# Patient Record
Sex: FEMALE | Race: WHITE | URBAN - METROPOLITAN AREA
[De-identification: names, ages, dates, MRNs, and addresses within clinical notes are randomized per-mention and may not be internally consistent; named-entity substitution may affect disease eponyms.]

---

## 2023-09-29 LAB
C. TRACHOMATIS, EXTERNAL RESULT: NEGATIVE
N. GONORRHOEAE, EXTERNAL RESULT: NEGATIVE

## 2023-10-24 LAB
ABO, EXTERNAL RESULT: NORMAL
HEP B, EXTERNAL RESULT: NEGATIVE
HEPATITIS C ANTIBODY, EXTERNAL RESULT: NEGATIVE
HIV, EXTERNAL RESULT: NON REACTIVE
RH FACTOR, EXTERNAL RESULT: NEGATIVE
RPR, EXTERNAL RESULT: NON REACTIVE
RUBELLA TITER, EXTERNAL RESULT: NORMAL

## 2024-04-23 LAB — GBS, EXTERNAL RESULT: NEGATIVE

## 2024-05-01 ENCOUNTER — HOSPITAL ENCOUNTER (INPATIENT)
Age: 34
LOS: 3 days | Discharge: HOME | End: 2024-05-04
Attending: OBSTETRICS & GYNECOLOGY | Admitting: OBSTETRICS & GYNECOLOGY
Payer: COMMERCIAL

## 2024-05-01 PROBLEM — O13.9 GESTATIONAL HYPERTENSION AFFECTING FIRST PREGNANCY: Status: ACTIVE | Noted: 2024-05-01

## 2024-05-01 LAB
ABO + RH BLD: NORMAL
ALBUMIN SERPL-MCNC: 3.5 G/DL (ref 3.4–5)
ALBUMIN/GLOB SERPL: 1.3 {RATIO} (ref 1.1–2.2)
ALP SERPL-CCNC: 83 U/L (ref 40–129)
ALT SERPL-CCNC: 10 U/L (ref 10–40)
AMPHETAMINES UR QL SCN>1000 NG/ML: NORMAL
ANION GAP SERPL CALCULATED.3IONS-SCNC: 13 MMOL/L (ref 3–16)
AST SERPL-CCNC: 17 U/L (ref 15–37)
BARBITURATES UR QL SCN>200 NG/ML: NORMAL
BASOPHILS # BLD: 0 K/UL (ref 0–0.2)
BASOPHILS NFR BLD: 0.2 %
BENZODIAZ UR QL SCN>200 NG/ML: NORMAL
BILIRUB SERPL-MCNC: <0.2 MG/DL (ref 0–1)
BLD GP AB SCN SERPL QL: NORMAL
BLOOD GROUP ANTIBODIES SERPL: NORMAL
BUN SERPL-MCNC: 9 MG/DL (ref 7–20)
BUPRENORPHINE+NOR UR QL SCN: NORMAL
CALCIUM SERPL-MCNC: 8.6 MG/DL (ref 8.3–10.6)
CANNABINOIDS UR QL SCN>50 NG/ML: NORMAL
CHLORIDE SERPL-SCNC: 105 MMOL/L (ref 99–110)
CO2 SERPL-SCNC: 20 MMOL/L (ref 21–32)
COCAINE UR QL SCN: NORMAL
CREAT SERPL-MCNC: 0.7 MG/DL (ref 0.6–1.1)
CREAT UR-MCNC: 131.1 MG/DL (ref 28–259)
DAT IGG CAPTURE: NORMAL
DEPRECATED RDW RBC AUTO: 13.7 % (ref 12.4–15.4)
DRUG SCREEN COMMENT UR-IMP: NORMAL
EOSINOPHIL # BLD: 0.2 K/UL (ref 0–0.6)
EOSINOPHIL NFR BLD: 1.3 %
FENTANYL SCREEN, URINE: NORMAL
GFR SERPLBLD CREATININE-BSD FMLA CKD-EPI: >90 ML/MIN/{1.73_M2}
GLUCOSE SERPL-MCNC: 80 MG/DL (ref 70–99)
HCT VFR BLD AUTO: 36.8 % (ref 36–48)
HGB BLD-MCNC: 12.5 G/DL (ref 12–16)
LDH SERPL L TO P-CCNC: 181 U/L (ref 100–190)
LYMPHOCYTES # BLD: 2.1 K/UL (ref 1–5.1)
LYMPHOCYTES NFR BLD: 17.9 %
MCH RBC QN AUTO: 29.3 PG (ref 26–34)
MCHC RBC AUTO-ENTMCNC: 33.9 G/DL (ref 31–36)
MCV RBC AUTO: 86.6 FL (ref 80–100)
METHADONE UR QL SCN>300 NG/ML: NORMAL
MONOCYTES # BLD: 1 K/UL (ref 0–1.3)
MONOCYTES NFR BLD: 8 %
NEUTROPHILS # BLD: 8.6 K/UL (ref 1.7–7.7)
NEUTROPHILS NFR BLD: 72.6 %
OPIATES UR QL SCN>300 NG/ML: NORMAL
OXYCODONE UR QL SCN: NORMAL
PCP UR QL SCN>25 NG/ML: NORMAL
PH UR STRIP: 5 [PH]
PLATELET # BLD AUTO: 178 K/UL (ref 135–450)
PMV BLD AUTO: 10.3 FL (ref 5–10.5)
POTASSIUM SERPL-SCNC: 3.8 MMOL/L (ref 3.5–5.1)
PROT SERPL-MCNC: 6.3 G/DL (ref 6.4–8.2)
PROT UR-MCNC: 16 MG/DL
PROT/CREAT UR-RTO: 0.1 MG/DL
RBC # BLD AUTO: 4.25 M/UL (ref 4–5.2)
SODIUM SERPL-SCNC: 138 MMOL/L (ref 136–145)
URATE SERPL-MCNC: 5.3 MG/DL (ref 2.6–6)
WBC # BLD AUTO: 11.9 K/UL (ref 4–11)

## 2024-05-01 PROCEDURE — 80307 DRUG TEST PRSMV CHEM ANLYZR: CPT

## 2024-05-01 PROCEDURE — 86901 BLOOD TYPING SEROLOGIC RH(D): CPT

## 2024-05-01 PROCEDURE — 86780 TREPONEMA PALLIDUM: CPT

## 2024-05-01 PROCEDURE — 6370000000 HC RX 637 (ALT 250 FOR IP): Performed by: ADVANCED PRACTICE MIDWIFE

## 2024-05-01 PROCEDURE — 86900 BLOOD TYPING SEROLOGIC ABO: CPT

## 2024-05-01 PROCEDURE — 84550 ASSAY OF BLOOD/URIC ACID: CPT

## 2024-05-01 PROCEDURE — 84156 ASSAY OF PROTEIN URINE: CPT

## 2024-05-01 PROCEDURE — 86850 RBC ANTIBODY SCREEN: CPT

## 2024-05-01 PROCEDURE — 86880 COOMBS TEST DIRECT: CPT

## 2024-05-01 PROCEDURE — 86870 RBC ANTIBODY IDENTIFICATION: CPT

## 2024-05-01 PROCEDURE — 82570 ASSAY OF URINE CREATININE: CPT

## 2024-05-01 PROCEDURE — 83615 LACTATE (LD) (LDH) ENZYME: CPT

## 2024-05-01 PROCEDURE — 1220000000 HC SEMI PRIVATE OB R&B

## 2024-05-01 PROCEDURE — 85025 COMPLETE CBC W/AUTO DIFF WBC: CPT

## 2024-05-01 PROCEDURE — 36415 COLL VENOUS BLD VENIPUNCTURE: CPT

## 2024-05-01 PROCEDURE — 80053 COMPREHEN METABOLIC PANEL: CPT

## 2024-05-01 RX ORDER — ONDANSETRON 4 MG/1
4 TABLET, ORALLY DISINTEGRATING ORAL EVERY 6 HOURS PRN
Status: DISCONTINUED | OUTPATIENT
Start: 2024-05-01 | End: 2024-05-04 | Stop reason: HOSPADM

## 2024-05-01 RX ORDER — METHYLERGONOVINE MALEATE 0.2 MG/ML
200 INJECTION INTRAVENOUS PRN
Status: DISCONTINUED | OUTPATIENT
Start: 2024-05-01 | End: 2024-05-04 | Stop reason: HOSPADM

## 2024-05-01 RX ORDER — TERBUTALINE SULFATE 1 MG/ML
0.25 INJECTION, SOLUTION SUBCUTANEOUS
Status: DISCONTINUED | OUTPATIENT
Start: 2024-05-01 | End: 2024-05-02

## 2024-05-01 RX ORDER — CARBOPROST TROMETHAMINE 250 UG/ML
250 INJECTION, SOLUTION INTRAMUSCULAR PRN
Status: DISCONTINUED | OUTPATIENT
Start: 2024-05-01 | End: 2024-05-04 | Stop reason: HOSPADM

## 2024-05-01 RX ORDER — DOCUSATE SODIUM 100 MG/1
100 CAPSULE, LIQUID FILLED ORAL 2 TIMES DAILY
Status: DISCONTINUED | OUTPATIENT
Start: 2024-05-01 | End: 2024-05-03 | Stop reason: SDUPTHER

## 2024-05-01 RX ORDER — FAMOTIDINE 10 MG/ML
20 INJECTION, SOLUTION INTRAVENOUS 2 TIMES DAILY
Status: DISCONTINUED | OUTPATIENT
Start: 2024-05-01 | End: 2024-05-04 | Stop reason: HOSPADM

## 2024-05-01 RX ORDER — SODIUM CHLORIDE, SODIUM LACTATE, POTASSIUM CHLORIDE, AND CALCIUM CHLORIDE .6; .31; .03; .02 G/100ML; G/100ML; G/100ML; G/100ML
500 INJECTION, SOLUTION INTRAVENOUS PRN
Status: DISCONTINUED | OUTPATIENT
Start: 2024-05-01 | End: 2024-05-04 | Stop reason: HOSPADM

## 2024-05-01 RX ORDER — NALBUPHINE HYDROCHLORIDE 10 MG/ML
10 INJECTION, SOLUTION INTRAMUSCULAR; INTRAVENOUS; SUBCUTANEOUS
Status: DISCONTINUED | OUTPATIENT
Start: 2024-05-01 | End: 2024-05-02

## 2024-05-01 RX ORDER — SODIUM CHLORIDE, SODIUM LACTATE, POTASSIUM CHLORIDE, AND CALCIUM CHLORIDE .6; .31; .03; .02 G/100ML; G/100ML; G/100ML; G/100ML
1000 INJECTION, SOLUTION INTRAVENOUS PRN
Status: DISCONTINUED | OUTPATIENT
Start: 2024-05-01 | End: 2024-05-04 | Stop reason: HOSPADM

## 2024-05-01 RX ORDER — MISOPROSTOL 100 UG/1
400 TABLET ORAL PRN
Status: DISCONTINUED | OUTPATIENT
Start: 2024-05-01 | End: 2024-05-04 | Stop reason: HOSPADM

## 2024-05-01 RX ORDER — FAMOTIDINE 20 MG/1
20 TABLET, FILM COATED ORAL 2 TIMES DAILY
Status: ON HOLD | COMMUNITY
End: 2024-05-04 | Stop reason: HOSPADM

## 2024-05-01 RX ORDER — SODIUM CHLORIDE 0.9 % (FLUSH) 0.9 %
5-40 SYRINGE (ML) INJECTION EVERY 12 HOURS SCHEDULED
Status: DISCONTINUED | OUTPATIENT
Start: 2024-05-01 | End: 2024-05-03 | Stop reason: SDUPTHER

## 2024-05-01 RX ORDER — SODIUM CHLORIDE 0.9 % (FLUSH) 0.9 %
5-40 SYRINGE (ML) INJECTION PRN
Status: DISCONTINUED | OUTPATIENT
Start: 2024-05-01 | End: 2024-05-04 | Stop reason: HOSPADM

## 2024-05-01 RX ORDER — ONDANSETRON 2 MG/ML
4 INJECTION INTRAMUSCULAR; INTRAVENOUS EVERY 6 HOURS PRN
Status: DISCONTINUED | OUTPATIENT
Start: 2024-05-01 | End: 2024-05-04 | Stop reason: HOSPADM

## 2024-05-01 RX ORDER — SODIUM CHLORIDE 9 MG/ML
25 INJECTION, SOLUTION INTRAVENOUS PRN
Status: DISCONTINUED | OUTPATIENT
Start: 2024-05-01 | End: 2024-05-04 | Stop reason: HOSPADM

## 2024-05-01 RX ORDER — SODIUM CHLORIDE, SODIUM LACTATE, POTASSIUM CHLORIDE, CALCIUM CHLORIDE 600; 310; 30; 20 MG/100ML; MG/100ML; MG/100ML; MG/100ML
INJECTION, SOLUTION INTRAVENOUS CONTINUOUS
Status: DISCONTINUED | OUTPATIENT
Start: 2024-05-01 | End: 2024-05-04 | Stop reason: HOSPADM

## 2024-05-01 RX ADMIN — Medication 25 MCG: at 23:00

## 2024-05-01 NOTE — PROGRESS NOTES
PT sent over from the office for elevated bp's. PT ambulated to T1. Oriented to room and telephone. Pt provided urine sample and was placed on EFM. Pt states she is feeling the baby move. Denies any leaking of fluid or vaginal bleeding.Pt denies headache, vision changes, N&V and epigastric pain. Pt denies feeling ctx.  Denies any additional pain/concerns at this time.

## 2024-05-01 NOTE — H&P
Department of Obstetrics and Gynecology   Obstetrics History and Physical        CHIEF COMPLAINT:  elevated BP in office    HISTORY OF PRESENT ILLNESS:      The patient is a 34 y.o. female at 37w1d.  OB History          1    Para        Term                AB        Living             SAB        IAB        Ectopic        Molar        Multiple        Live Births                Patient presents with a chief complaint as above and is being admitted for unplanned induction of labor secondary to gestational hypertension. Had two elevated Bps in office today with trace protein in her urine. Has now had elevated Bps >4 hrs apart. Has significant pedal edema and a 6# weight gain in 1 week. Denies visual disturbances, headache, RUQ pain. Endorses fetal movement.     Estimated Due Date: Estimated Date of Delivery: 24    PRENATAL CARE:  Normal Mat21 - XY  Normal level 1 sono    Complicated by: obesity - TWG 44#  Rubella non-immune  Rh negative - Rhogam given on 24  Abnormal 1 hour with normal 3 hr      PAST OB HISTORY:  OB History          1    Para        Term                AB        Living             SAB        IAB        Ectopic        Molar        Multiple        Live Births                    Past Medical History:    History reviewed. No pertinent past medical history.  Past Surgical History:    History reviewed. No pertinent surgical history.  Allergies:  Patient has no known allergies.    Social History:    Social History     Socioeconomic History    Marital status:      Spouse name: Not on file    Number of children: Not on file    Years of education: Not on file    Highest education level: Not on file   Occupational History    Not on file   Tobacco Use    Smoking status: Never    Smokeless tobacco: Never   Substance and Sexual Activity    Alcohol use: Never    Drug use: Never    Sexual activity: Yes     Partners: Male   Other Topics Concern    Not on file   Social History  Narrative    Not on file     Social Determinants of Health     Financial Resource Strain: Not on file   Food Insecurity: Not on file   Transportation Needs: Not on file   Physical Activity: Not on file   Stress: Not on file   Social Connections: Not on file   Intimate Partner Violence: Not on file   Housing Stability: Not on file     Family History:   History reviewed. No pertinent family history.  Medications Prior to Admission:  Medications Prior to Admission: Prenatal MV-Min-Fe Fum-FA-DHA (PRENATAL 1 PO), Take by mouth  famotidine (PEPCID) 20 MG tablet, Take 1 tablet by mouth 2 times daily    REVIEW OF SYSTEMS:    Denies regular contractions, decreased FM, leaking of fluid, vaginal bleeding, headache, blurred vision, fever, SOB.      PHYSICAL EXAM:  Vitals:    05/01/24 1731 05/01/24 1750 05/01/24 1801   BP: (!) 137/95 137/88 (!) 143/94   Pulse: 85 81 89   Resp: 16     Temp: 97.9 °F (36.6 °C)     TempSrc: Oral     Weight: 110.7 kg (244 lb)     Height: 1.651 m (5' 5\")       General appearance:  awake, alert, cooperative, no apparent distress, and appears stated age  Neurologic:  Awake, alert, oriented to name, place and time.    Lungs:  No increased work of breathing, good air exchange  Abdomen:  Soft, non tender, gravid, consistent with her gestational age, EFW by Leopold's maneuver was 7 lbs   Fetal heart rate:  Reassuring.  Pelvis:  Adequate pelvis  Cervix: 1/50/-2  Contraction frequency:  quiet    Membranes:  Intact    Labs: CBC:   Lab Results   Component Value Date/Time    WBC 11.9 05/01/2024 06:04 PM    RBC 4.25 05/01/2024 06:04 PM    HGB 12.5 05/01/2024 06:04 PM    HCT 36.8 05/01/2024 06:04 PM    MCV 86.6 05/01/2024 06:04 PM    MCH 29.3 05/01/2024 06:04 PM    MCHC 33.9 05/01/2024 06:04 PM    RDW 13.7 05/01/2024 06:04 PM     05/01/2024 06:04 PM    MPV 10.3 05/01/2024 06:04 PM     CMP:    Lab Results   Component Value Date/Time     05/01/2024 06:04 PM    K 3.8 05/01/2024 06:04 PM

## 2024-05-02 ENCOUNTER — ANESTHESIA EVENT (OUTPATIENT)
Dept: LABOR AND DELIVERY | Age: 34
End: 2024-05-02
Payer: COMMERCIAL

## 2024-05-02 ENCOUNTER — ANESTHESIA (OUTPATIENT)
Dept: LABOR AND DELIVERY | Age: 34
End: 2024-05-02
Payer: COMMERCIAL

## 2024-05-02 LAB — REAGIN+T PALLIDUM IGG+IGM SERPL-IMP: NORMAL

## 2024-05-02 PROCEDURE — 6360000002 HC RX W HCPCS

## 2024-05-02 PROCEDURE — 1220000000 HC SEMI PRIVATE OB R&B

## 2024-05-02 PROCEDURE — 2500000003 HC RX 250 WO HCPCS

## 2024-05-02 PROCEDURE — 2580000003 HC RX 258: Performed by: ADVANCED PRACTICE MIDWIFE

## 2024-05-02 PROCEDURE — 2580000003 HC RX 258: Performed by: OBSTETRICS & GYNECOLOGY

## 2024-05-02 PROCEDURE — 0HQ9XZZ REPAIR PERINEUM SKIN, EXTERNAL APPROACH: ICD-10-PCS | Performed by: ADVANCED PRACTICE MIDWIFE

## 2024-05-02 PROCEDURE — 88307 TISSUE EXAM BY PATHOLOGIST: CPT

## 2024-05-02 PROCEDURE — 6370000000 HC RX 637 (ALT 250 FOR IP): Performed by: ADVANCED PRACTICE MIDWIFE

## 2024-05-02 PROCEDURE — 2500000003 HC RX 250 WO HCPCS: Performed by: NURSE ANESTHETIST, CERTIFIED REGISTERED

## 2024-05-02 PROCEDURE — 7200000001 HC VAGINAL DELIVERY

## 2024-05-02 PROCEDURE — 6370000000 HC RX 637 (ALT 250 FOR IP)

## 2024-05-02 PROCEDURE — 6360000002 HC RX W HCPCS: Performed by: ADVANCED PRACTICE MIDWIFE

## 2024-05-02 PROCEDURE — 6360000002 HC RX W HCPCS: Performed by: OBSTETRICS & GYNECOLOGY

## 2024-05-02 PROCEDURE — 0UQMXZZ REPAIR VULVA, EXTERNAL APPROACH: ICD-10-PCS | Performed by: ADVANCED PRACTICE MIDWIFE

## 2024-05-02 RX ORDER — SODIUM CHLORIDE 9 MG/ML
INJECTION, SOLUTION INTRAVENOUS PRN
Status: DISCONTINUED | OUTPATIENT
Start: 2024-05-02 | End: 2024-05-04 | Stop reason: HOSPADM

## 2024-05-02 RX ORDER — LABETALOL HYDROCHLORIDE 5 MG/ML
20 INJECTION, SOLUTION INTRAVENOUS ONCE
Status: COMPLETED | OUTPATIENT
Start: 2024-05-02 | End: 2024-05-02

## 2024-05-02 RX ORDER — ACETAMINOPHEN 500 MG
1000 TABLET ORAL EVERY 8 HOURS SCHEDULED
Status: DISCONTINUED | OUTPATIENT
Start: 2024-05-02 | End: 2024-05-04 | Stop reason: HOSPADM

## 2024-05-02 RX ORDER — MAGNESIUM SULFATE HEPTAHYDRATE 40 MG/ML
4000 INJECTION, SOLUTION INTRAVENOUS ONCE
Status: COMPLETED | OUTPATIENT
Start: 2024-05-02 | End: 2024-05-02

## 2024-05-02 RX ORDER — LIDOCAINE HYDROCHLORIDE 20 MG/ML
INJECTION, SOLUTION EPIDURAL; INFILTRATION; INTRACAUDAL; PERINEURAL PRN
Status: DISCONTINUED | OUTPATIENT
Start: 2024-05-02 | End: 2024-05-02 | Stop reason: SDUPTHER

## 2024-05-02 RX ORDER — LANOLIN 100 %
OINTMENT (GRAM) TOPICAL PRN
Status: DISCONTINUED | OUTPATIENT
Start: 2024-05-02 | End: 2024-05-04 | Stop reason: HOSPADM

## 2024-05-02 RX ORDER — SODIUM CHLORIDE 0.9 % (FLUSH) 0.9 %
5-40 SYRINGE (ML) INJECTION PRN
Status: DISCONTINUED | OUTPATIENT
Start: 2024-05-02 | End: 2024-05-04 | Stop reason: HOSPADM

## 2024-05-02 RX ORDER — SODIUM CHLORIDE 0.9 % (FLUSH) 0.9 %
5-40 SYRINGE (ML) INJECTION EVERY 12 HOURS SCHEDULED
Status: DISCONTINUED | OUTPATIENT
Start: 2024-05-02 | End: 2024-05-04 | Stop reason: HOSPADM

## 2024-05-02 RX ORDER — FERROUS SULFATE 325(65) MG
325 TABLET ORAL EVERY OTHER DAY
Status: DISCONTINUED | OUTPATIENT
Start: 2024-05-03 | End: 2024-05-04 | Stop reason: HOSPADM

## 2024-05-02 RX ORDER — IBUPROFEN 800 MG/1
TABLET ORAL
Status: COMPLETED
Start: 2024-05-02 | End: 2024-05-02

## 2024-05-02 RX ORDER — DOCUSATE SODIUM 100 MG/1
100 CAPSULE, LIQUID FILLED ORAL 2 TIMES DAILY
Status: DISCONTINUED | OUTPATIENT
Start: 2024-05-02 | End: 2024-05-04 | Stop reason: HOSPADM

## 2024-05-02 RX ORDER — IBUPROFEN 800 MG/1
800 TABLET ORAL EVERY 8 HOURS SCHEDULED
Status: DISCONTINUED | OUTPATIENT
Start: 2024-05-03 | End: 2024-05-04 | Stop reason: HOSPADM

## 2024-05-02 RX ORDER — BUPIVACAINE HYDROCHLORIDE 2.5 MG/ML
INJECTION, SOLUTION EPIDURAL; INFILTRATION; INTRACAUDAL PRN
Status: DISCONTINUED | OUTPATIENT
Start: 2024-05-02 | End: 2024-05-02 | Stop reason: SDUPTHER

## 2024-05-02 RX ADMIN — Medication 1000 MG/HR: at 03:59

## 2024-05-02 RX ADMIN — Medication 25 MCG: at 06:55

## 2024-05-02 RX ADMIN — LABETALOL HYDROCHLORIDE 20 MG: 5 INJECTION INTRAVENOUS at 03:33

## 2024-05-02 RX ADMIN — SODIUM CHLORIDE, POTASSIUM CHLORIDE, SODIUM LACTATE AND CALCIUM CHLORIDE: 600; 310; 30; 20 INJECTION, SOLUTION INTRAVENOUS at 03:42

## 2024-05-02 RX ADMIN — BUPIVACAINE HYDROCHLORIDE 1 ML: 2.5 INJECTION, SOLUTION EPIDURAL; INFILTRATION; INTRACAUDAL; PERINEURAL at 14:09

## 2024-05-02 RX ADMIN — LIDOCAINE HYDROCHLORIDE 7 ML: 20 INJECTION, SOLUTION EPIDURAL; INFILTRATION; INTRACAUDAL; PERINEURAL at 19:08

## 2024-05-02 RX ADMIN — Medication 25 MCG: at 03:00

## 2024-05-02 RX ADMIN — Medication 15 ML/HR: at 14:29

## 2024-05-02 RX ADMIN — SODIUM CHLORIDE, PRESERVATIVE FREE 10 ML: 5 INJECTION INTRAVENOUS at 03:40

## 2024-05-02 RX ADMIN — IBUPROFEN 800 MG: 800 TABLET, FILM COATED ORAL at 21:53

## 2024-05-02 RX ADMIN — MAGNESIUM SULFATE IN WATER FOR 4000 MG: 40 INJECTION INTRAVENOUS at 03:46

## 2024-05-02 RX ADMIN — Medication 2000 MG/HR: at 14:29

## 2024-05-02 RX ADMIN — Medication 25 MCG: at 10:46

## 2024-05-02 RX ADMIN — Medication 1 MILLI-UNITS/MIN: at 15:40

## 2024-05-02 RX ADMIN — SODIUM CHLORIDE, PRESERVATIVE FREE 10 ML: 5 INJECTION INTRAVENOUS at 21:40

## 2024-05-02 RX ADMIN — SODIUM CHLORIDE, POTASSIUM CHLORIDE, SODIUM LACTATE AND CALCIUM CHLORIDE: 600; 310; 30; 20 INJECTION, SOLUTION INTRAVENOUS at 15:39

## 2024-05-02 ASSESSMENT — PAIN SCALES - GENERAL: PAINLEVEL_OUTOF10: 5

## 2024-05-02 ASSESSMENT — PAIN DESCRIPTION - DESCRIPTORS: DESCRIPTORS: ACHING;DISCOMFORT

## 2024-05-02 ASSESSMENT — PAIN DESCRIPTION - ORIENTATION: ORIENTATION: LOWER

## 2024-05-02 ASSESSMENT — PAIN DESCRIPTION - LOCATION: LOCATION: PERINEUM

## 2024-05-02 NOTE — PROGRESS NOTES
Department of Obstetrics and Gynecology  Labor and Delivery   CNM Progress Note      SUBJECTIVE:  RN calls with two severe range blood pressures 15 minutes apart. Pt otherwise without complaints.    OBJECTIVE:      Fetal heart rate:       Baseline Heart Rate:  140        Accelerations:  present       Long Term Variability:  moderate       Decelerations:  absent         Contraction frequency: 1-2 minutes    Membranes:  Intact    Cervix:  /-2             ASSESSMENT & PLAN:    34 y.o.    OB History          1    Para   0    Term   0       0    AB   0    Living   0         SAB   0    IAB   0    Ectopic   0    Molar   0    Multiple   0    Live Births   0             37w2d  1. FWB: reassuring  2.  Status post second cytotec. Continue current plan.   3. Plan for IV labetalol 20 mg   4. Instructed to give 4 gm bolus of mag sulfate with a 2 gm maintenance dose  5. Will continue to monitor closely    Notified Dr. Shahriar Ayala of Unusual clinical condition     Griselda Small, BENNIE - PRESTONM

## 2024-05-02 NOTE — PROGRESS NOTES
Remains comfortable with epidural  Afebrile, B/P normal range  MGSO4 2 gm/hour  Urine output is normal  FHT Category 1  Contractions q 2-4 minutes IUPC inserted w/o difficulty. Baseline is 10 and peak 25-50. Pitocin currently at 2 miu  Cx 3/90/-1 unchanged from previous exam  Titrate pitocin to achieve adequate contractions.   Anticipate vaginal delivery

## 2024-05-02 NOTE — PROGRESS NOTES
Pt reports contractions are mild. Denies headache, visual disturbances, epigastric pain.  B/Ps 130-158/79-91 over last 4 hours.   MGSO4 infusing at 2gm/hour. Urine output is adequate   moderate variability. Accels present. Category1  Contractions q 2-5 minutes mild  Cx 2/70/-2 soft/anterior Cytotec #4 placed in posterior vaginal vault  +2 edema of LE. Reflexes 2+, no clonus  Dr Paulino updated w/ pt status and plan of care  Continue IOL  Anticipate vaginal delivery

## 2024-05-02 NOTE — PROGRESS NOTES
Comfortable with epidural. Denies headaches, visual changes, epigastric pain  Afebrile,b/ps  normal to mild range elevation  MGSO4 at 2gm/hour  Urine output is adequate. Roach placed after epidural   FHT Category 1  Contractions q 2-4   SROM at ~1230 pm  Cx 3/90/-1, leaking clear fluid  Plan to begin Pitocin argumentation  Continue plan of care  Anticipate vaginal delivery

## 2024-05-02 NOTE — PROGRESS NOTES
Pt called this RN stating she thought her water had broken. Lenore DUNBAR RN went to pt bedside and visualized clear fluid of pad but no active leaking of fluid from vagina. New chux pad and large volodymyr pad placed at this. Adjusted EFM and TOCO. Will continue to monitor.

## 2024-05-02 NOTE — PLAN OF CARE
Problem: Vaginal Birth or  Section  Goal: Fetal and maternal status remain reassuring during the birth process  Description:  Birth OB-Pregnancy care plan goal which identifies if the fetal and maternal status remain reassuring during the birth process  2024 by Henok Foster RN  Outcome: Progressing  2024 by Henok Foster RN  Outcome: Progressing     Problem: Pain  Goal: Verbalizes/displays adequate comfort level or baseline comfort level  2024 by Henok Foster RN  Outcome: Progressing  2024 by Henok Foster RN  Outcome: Progressing     Problem: Infection - Adult  Goal: Absence of infection at discharge  Outcome: Progressing  Goal: Absence of infection during hospitalization  Outcome: Progressing  Goal: Absence of fever/infection during anticipated neutropenic period  Outcome: Progressing     Problem: Safety - Adult  Goal: Free from fall injury  Outcome: Progressing     Problem: Discharge Planning  Goal: Discharge to home or other facility with appropriate resources  Outcome: Progressing     Problem: Chronic Conditions and Co-morbidities  Goal: Patient's chronic conditions and co-morbidity symptoms are monitored and maintained or improved  Outcome: Progressing

## 2024-05-02 NOTE — PROGRESS NOTES
TRISTIAN Small CNM called for update on patient. BP stable after one time  dose of Labetalol. Okay to place 3rd dose of Cytotec at next schedule time.

## 2024-05-02 NOTE — ANESTHESIA PROCEDURE NOTES
CSE Block    Patient location during procedure: OB  Start time: 5/2/2024 1:50 PM  End time: 5/2/2024 2:29 PM  Reason for block: labor epidural  Staffing  Performed: resident/CRNA   Resident/CRNA: Palak Wylie APRN - DINO  Other anesthesia staff: Gunjan Echols RN  Performed by: Gunjan Echols RN  Authorized by: Jared Chamberlain MD    CSE  Patient position: sitting  Prep: ChloraPrep  Patient monitoring: frequent blood pressure checks and continuous pulse ox  Approach: midline  Provider prep: mask and sterile gloves  Spinal Needle  Needle type: pencil-tip   Needle gauge: 25 G  Needle length: 3.5 in  Epidural Needle  Injection technique: PABLITO saline  Needle type: Tuohy   Needle gauge: 17 G  Needle length: 3.5 in  Needle insertion depth: 7 cm  Location: lumbar (1-5)  Catheter  Catheter type: side hole  Catheter size: 19 G  Catheter at skin depth: 12 cm  Assessment  Hemodynamics: stable  Additional Notes  Patient in sitting position prep draped applied in sterile fashion , skin infiltration with 1% lidocaine 3mls, Touhy inserted PABLITO with normal saline , CSE with pencan needle with CSF swirl noted , advanced without difficulty test dose negative negative after aspiration form catheter . Sterile dressing applied . Patient tolerated procedure well.   Preanesthetic Checklist  Completed: patient identified, IV checked, site marked, risks and benefits discussed, surgical/procedural consents, equipment checked, pre-op evaluation, timeout performed, anesthesia consent given, oxygen available, monitors applied/VS acknowledged, fire risk safety assessment completed and verbalized and blood product R/B/A discussed and consented

## 2024-05-02 NOTE — FLOWSHEET NOTE
TRISTIAN Small notified of BP's noted. Consulted with Dr. Ayala and gave orders to give a one time dose 20mg of Labetalol and start Magnesium. 4G bolus and 2G/hr maintenance dose.    05/02/24 0300 05/02/24 0316   Maternal Vitals (MEW-T)   Temp 98.2 °F (36.8 °C)  --    Temp Source Oral  --    Pulse 61 69   Heart Rate Source Monitor Monitor   Respirations 16 16   BP (!) 164/95 (!) 175/103   BP Method Automatic Automatic   Patient Position Semi fowlers Semi fowlers   Level of Consciousness Alert Alert

## 2024-05-02 NOTE — ANESTHESIA PRE PROCEDURE
Department of Anesthesiology  Preprocedure Note       Name:  Inge Gaspar   Age:  34 y.o.  :  1990                                          MRN:  [id number]         Date:  2024      Surgeon: * No surgeons listed *    Procedure: * No procedures listed *    Medications prior to admission:   Prior to Admission medications    Medication Sig Start Date End Date Taking? Authorizing Provider   Prenatal MV-Min-Fe Fum-FA-DHA (PRENATAL 1 PO) Take by mouth   Yes ProviderKari MD   famotidine (PEPCID) 20 MG tablet Take 1 tablet by mouth 2 times daily   Yes Provider, MD Kari       Current medications:    Current Facility-Administered Medications   Medication Dose Route Frequency Provider Last Rate Last Admin    magnesium sulfate (40423 mg/500mL infusion)  2,000 mg/hr IntraVENous Continuous Shahriar Ayala MD 50 mL/hr at 24 1429 2,000 mg/hr at 24 1429    lactated ringers IV soln infusion   IntraVENous Continuous Shahriar Ayala MD 75 mL/hr at 24 1301 Rate Verify at 24 1301    lactated ringers bolus 500 mL  500 mL IntraVENous PRN Griselda Small APRN - CNMARLEN        Or    lactated ringers bolus 1,000 mL  1,000 mL IntraVENous PRN Griselda Small APRN - CNM        sodium chloride flush 0.9 % injection 5-40 mL  5-40 mL IntraVENous 2 times per day Griselda Small APRN - CNM   10 mL at 24 0340    sodium chloride flush 0.9 % injection 5-40 mL  5-40 mL IntraVENous PRN Griselda Small APRN - CNM        0.9 % sodium chloride infusion  25 mL IntraVENous PRN Griselda Small APRN - CNM        methylergonovine (METHERGINE) injection 200 mcg  200 mcg IntraMUSCular PRN Griselda Small APRN - CNMARLEN        carboprost (HEMABATE) injection 250 mcg  250 mcg IntraMUSCular PRN Griselda Small APRN - CNM        miSOPROStol (CYTOTEC) tablet 400 mcg  400 mcg Buccal PRN Griselda Small APRN - CNMARLEN        tranexamic acid (CYKLOKAPRON) 1,000  Smoking status: Never    Smokeless tobacco: Never   Substance Use Topics    Alcohol use: Never                                Counseling given: Not Answered      Vital Signs (Current):   Vitals:    05/02/24 1130 05/02/24 1200 05/02/24 1301 05/02/24 1429   BP:  (!) 142/81 (!) 146/88 121/66   Pulse:  75 86 86   Resp:  16 16    Temp:  36.8 °C (98.2 °F)  36.8 °C (98.3 °F)   TempSrc:  Oral     SpO2: 100% 100% 100% 97%   Weight:       Height:                                                  BP Readings from Last 3 Encounters:   05/02/24 121/66       NPO Status:                                                                                 BMI:   Wt Readings from Last 3 Encounters:   05/01/24 110.7 kg (244 lb)     Body mass index is 40.6 kg/m².    CBC:   Lab Results   Component Value Date/Time    WBC 11.9 05/01/2024 06:04 PM    RBC 4.25 05/01/2024 06:04 PM    HGB 12.5 05/01/2024 06:04 PM    HCT 36.8 05/01/2024 06:04 PM    MCV 86.6 05/01/2024 06:04 PM    RDW 13.7 05/01/2024 06:04 PM     05/01/2024 06:04 PM       CMP:   Lab Results   Component Value Date/Time     05/01/2024 06:04 PM    K 3.8 05/01/2024 06:04 PM     05/01/2024 06:04 PM    CO2 20 05/01/2024 06:04 PM    BUN 9 05/01/2024 06:04 PM    CREATININE 0.7 05/01/2024 06:04 PM    AGRATIO 1.3 05/01/2024 06:04 PM    LABGLOM >90 05/01/2024 06:04 PM    GLUCOSE 80 05/01/2024 06:04 PM    CALCIUM 8.6 05/01/2024 06:04 PM    BILITOT <0.2 05/01/2024 06:04 PM    ALKPHOS 83 05/01/2024 06:04 PM    AST 17 05/01/2024 06:04 PM    ALT 10 05/01/2024 06:04 PM       POC Tests: No results for input(s): \"POCGLU\", \"POCNA\", \"POCK\", \"POCCL\", \"POCBUN\", \"POCHEMO\", \"POCHCT\" in the last 72 hours.    Coags: No results found for: \"PROTIME\", \"INR\", \"APTT\"    HCG (If Applicable): No results found for: \"PREGTESTUR\", \"PREGSERUM\", \"HCG\", \"HCGQUANT\"     ABGs: No results found for: \"PHART\", \"PO2ART\", \"KBX9XVW\", \"VLD9QYE\", \"BEART\", \"U2HCYIAA\"     Type & Screen (If Applicable):  No results

## 2024-05-02 NOTE — PLAN OF CARE
Problem: Vaginal Birth or  Section  Goal: Fetal and maternal status remain reassuring during the birth process  Description:  Birth OB-Pregnancy care plan goal which identifies if the fetal and maternal status remain reassuring during the birth process  2024 by Rachel Meredith RN  Outcome: Progressing  2024 by Henok Foster RN  Outcome: Progressing  2024 by Henok Foster RN  Outcome: Progressing     Problem: Pain  Goal: Verbalizes/displays adequate comfort level or baseline comfort level  2024 07 by Rachel Meredith RN  Outcome: Progressing  2024 by Henok Foster RN  Outcome: Progressing  2024 by Henok Foster RN  Outcome: Progressing     Problem: Infection - Adult  Goal: Absence of infection at discharge  2024 07 by Rachel Meredith RN  Outcome: Progressing  2024 by Henok Foster RN  Outcome: Progressing  Goal: Absence of infection during hospitalization  2024 07 by Rachel Meredith RN  Outcome: Progressing  2024 by Henok Foster RN  Outcome: Progressing  Goal: Absence of fever/infection during anticipated neutropenic period  2024 07 by Rachel Meredith RN  Outcome: Progressing  2024 by Henok Foster RN  Outcome: Progressing     Problem: Safety - Adult  Goal: Free from fall injury  2024 by Rachel Meredith RN  Outcome: Progressing  2024 by Henok Foster RN  Outcome: Progressing     Problem: Discharge Planning  Goal: Discharge to home or other facility with appropriate resources  2024 by Rachel Meredith RN  Outcome: Progressing  2024 by Henok Foster RN  Outcome: Progressing     Problem: Chronic Conditions and Co-morbidities  Goal: Patient's chronic conditions and co-morbidity symptoms are monitored and maintained or improved  2024 by Rachel Meredith RN  Outcome: Progressing  2024  by Henok Foster, RN  Outcome: Progressing

## 2024-05-03 PROBLEM — D62 ACUTE BLOOD LOSS ANEMIA: Status: ACTIVE | Noted: 2024-05-03

## 2024-05-03 LAB
ABO + RH BLD: NORMAL
DEPRECATED RDW RBC AUTO: 14 % (ref 12.4–15.4)
FETAL SCREEN: NORMAL
HCT VFR BLD AUTO: 31.6 % (ref 36–48)
HGB BLD-MCNC: 10.6 G/DL (ref 12–16)
KLEIHAUER-BETKE: NORMAL
MCH RBC QN AUTO: 29.5 PG (ref 26–34)
MCHC RBC AUTO-ENTMCNC: 33.5 G/DL (ref 31–36)
MCV RBC AUTO: 88 FL (ref 80–100)
PLATELET # BLD AUTO: 154 K/UL (ref 135–450)
PMV BLD AUTO: 9.9 FL (ref 5–10.5)
RBC # BLD AUTO: 3.58 M/UL (ref 4–5.2)
RHIG LOT NUMBER: NORMAL
WBC # BLD AUTO: 16.5 K/UL (ref 4–11)

## 2024-05-03 PROCEDURE — 6370000000 HC RX 637 (ALT 250 FOR IP): Performed by: ADVANCED PRACTICE MIDWIFE

## 2024-05-03 PROCEDURE — 86901 BLOOD TYPING SEROLOGIC RH(D): CPT

## 2024-05-03 PROCEDURE — 86900 BLOOD TYPING SEROLOGIC ABO: CPT

## 2024-05-03 PROCEDURE — 6360000002 HC RX W HCPCS: Performed by: ADVANCED PRACTICE MIDWIFE

## 2024-05-03 PROCEDURE — 85027 COMPLETE CBC AUTOMATED: CPT

## 2024-05-03 PROCEDURE — 2580000003 HC RX 258: Performed by: ADVANCED PRACTICE MIDWIFE

## 2024-05-03 PROCEDURE — 85460 HEMOGLOBIN FETAL: CPT

## 2024-05-03 PROCEDURE — 96372 THER/PROPH/DIAG INJ SC/IM: CPT

## 2024-05-03 PROCEDURE — 85461 HEMOGLOBIN FETAL: CPT

## 2024-05-03 PROCEDURE — 1220000000 HC SEMI PRIVATE OB R&B

## 2024-05-03 RX ORDER — NIFEDIPINE 30 MG/1
30 TABLET, EXTENDED RELEASE ORAL DAILY
Status: DISCONTINUED | OUTPATIENT
Start: 2024-05-03 | End: 2024-05-04 | Stop reason: HOSPADM

## 2024-05-03 RX ADMIN — DOCUSATE SODIUM 100 MG: 100 CAPSULE, LIQUID FILLED ORAL at 19:32

## 2024-05-03 RX ADMIN — MOXIFLOXACIN HYDROCHLORIDE 800 MG: 400 TABLET, FILM COATED ORAL at 06:00

## 2024-05-03 RX ADMIN — SODIUM CHLORIDE, PRESERVATIVE FREE 10 ML: 5 INJECTION INTRAVENOUS at 19:34

## 2024-05-03 RX ADMIN — DOCUSATE SODIUM 100 MG: 100 CAPSULE, LIQUID FILLED ORAL at 00:24

## 2024-05-03 RX ADMIN — RHO(D) IMMUNE GLOBULIN (HUMAN) 300 MCG: 1500 SOLUTION INTRAMUSCULAR at 12:26

## 2024-05-03 RX ADMIN — ACETAMINOPHEN 1000 MG: 500 TABLET ORAL at 00:24

## 2024-05-03 RX ADMIN — DOCUSATE SODIUM 100 MG: 100 CAPSULE, LIQUID FILLED ORAL at 10:22

## 2024-05-03 RX ADMIN — NIFEDIPINE 30 MG: 30 TABLET, FILM COATED, EXTENDED RELEASE ORAL at 14:16

## 2024-05-03 RX ADMIN — MAGNESIUM SULFATE HEPTAHYDRATE 2000 MG/HR: 40 INJECTION, SOLUTION INTRAVENOUS at 10:22

## 2024-05-03 RX ADMIN — ACETAMINOPHEN 1000 MG: 500 TABLET ORAL at 10:22

## 2024-05-03 RX ADMIN — ACETAMINOPHEN 1000 MG: 500 TABLET ORAL at 18:05

## 2024-05-03 ASSESSMENT — PAIN SCALES - GENERAL
PAINLEVEL_OUTOF10: 5
PAINLEVEL_OUTOF10: 3
PAINLEVEL_OUTOF10: 5

## 2024-05-03 ASSESSMENT — PAIN DESCRIPTION - LOCATION
LOCATION: PERINEUM

## 2024-05-03 ASSESSMENT — PAIN - FUNCTIONAL ASSESSMENT: PAIN_FUNCTIONAL_ASSESSMENT: ACTIVITIES ARE NOT PREVENTED

## 2024-05-03 ASSESSMENT — PAIN DESCRIPTION - DESCRIPTORS
DESCRIPTORS: DISCOMFORT;SORE
DESCRIPTORS: ACHING
DESCRIPTORS: DISCOMFORT;CRAMPING;SORE

## 2024-05-03 ASSESSMENT — PAIN DESCRIPTION - ORIENTATION: ORIENTATION: LOWER

## 2024-05-03 NOTE — PROGRESS NOTES
RN  and CNM remained at bedside throughout pushing. EFM continuously assessed. Vaginal delivery of viable male infant.

## 2024-05-03 NOTE — PROGRESS NOTES
Spoke with TRISTIAN Small CNM face to face in department to make her aware of patient blood pressure 162/100 with repeat blood pressure of 131/81.  No change in plan of care at this time.

## 2024-05-03 NOTE — PROGRESS NOTES
Patient actively pushing. RN remains in continuous attendance at the bedside. Assessment & evaluation of fetal heart rate and contraction pattern ongoing via continuous EFM.

## 2024-05-03 NOTE — ANESTHESIA POSTPROCEDURE EVALUATION
Department of Anesthesiology  Postprocedure Note    Patient: Inge Gaspar  MRN: [id number]  YOB: 1990  Date of evaluation: 5/3/2024    Procedure Summary       Date: 05/02/24 Room / Location:     Anesthesia Start: 1350 Anesthesia Stop: 1933    Procedure: Labor Analgesia Diagnosis:     Scheduled Providers:  Responsible Provider: Jared Chamberlain MD    Anesthesia Type: CSE ASA Status: 2            Anesthesia Type: No value filed.    Sonya Phase I:      Sonya Phase II:      Anesthesia Post Evaluation    Level of consciousness: awake  Cardiovascular status: hemodynamically stable  Respiratory status: acceptable  Comments: No apparent complications from neuraxial anesthesia.  Pain management: adequate        No notable events documented.

## 2024-05-03 NOTE — L&D DELIVERY SUMMARY NOTE
Department of Obstetrics and Gynecology  Spontaneous Vaginal Delivery Note    Labor & Delivery Summary  Dilation Complete Date: 24  Dilation Complete Time: 1835    Pre-operative Diagnosis:  Term pregnancy, Induced labor, Single fetus, and Pre eclampsia     Post-operative Diagnosis:  Living  infant(s) and Male    Procedure:  Spontaneous vaginal delivery or Repair first degree spontaneous laceration    Surgeon:   EYAL Christianson CNM    Information for the patient's :  Baltazar Gaspar [[id number]]        Anesthesia:  epidural anesthesia    Estimated blood loss:  300ml    Specimen:  Placenta sent to pathology     Cord blood sent Yes    Complications:  Pre eclampsia with MGSO4    Condition:  infant stable and skin to skin with the mother and mother stable    Details of Procedure:   The patient is a 34 y.o. female at 37w2d   OB History          1    Para   0    Term   0       0    AB   0    Living   0         SAB   0    IAB   0    Ectopic   0    Molar   0    Multiple   0    Live Births   0             who was admitted for Gestational hypertension, developed severe range b/ps and received IV labetalol x 1 dose and MGSO4 bolus and 2 gms/hour. . She received the following interventions: vaginal Cytotec and IV Pitocin augmentation She was known to be GBS negative and did not receive antibiotic prophylaxis. The patient progressed well,did receive an epidural, became complete and started to push. After pushing for 15 minutes the fetal head was at the perineum and the rest of the infant delivered atraumatically and was placed on the mother's abdomen.The infant established good color, tone and cry with drying. Apgars 7-9. Cord was clamped and cut after 2 minute delay and infant was placed skin to skin with the mother.  The delivery of the placenta was spontaneous. The perineum and vagina were explored and bilateral labial first degree lacerations were repaired in standard fashion. This mother plans to

## 2024-05-03 NOTE — PROGRESS NOTES
Department of Obstetrics and Gynecology  Labor and Delivery  Attending Post Partum Progress Note      SUBJECTIVE:  Pt is happy and reports to be doing well. Reports bottle feeding is going well. Ambulating and voiding without difficulty. Tolerating a normal diet. Normal lochia. Pain well controlled with current measures.     OBJECTIVE:      Vitals:  BP (!) 150/85   Pulse 80   Temp 98.3 °F (36.8 °C) (Oral)   Resp 16   Ht 1.651 m (5' 5\")   Wt 110.7 kg (244 lb)   SpO2 100%   Breastfeeding Unknown   BMI 40.60 kg/m²     ABDOMEN:  ff/m/u-1  GENITAL/URINARY:  scant, deferred    DATA:    CBC:    Lab Results   Component Value Date/Time    WBC 16.5 2024 05:54 AM    RBC 3.58 2024 05:54 AM    HGB 10.6 2024 05:54 AM    HCT 31.6 2024 05:54 AM    MCV 88.0 2024 05:54 AM    RDW 14.0 2024 05:54 AM     2024 05:54 AM       ASSESSMENT & PLAN:      Principal Problem:    Gestational hypertension affecting first pregnancy  Plan: Asymptomatic, pressures mild to severe range (not sustained), mag sulfate running at 50 mL/hr  Active Problems:     (normal spontaneous vaginal delivery)  Plan: Routine PP care    PPD#1, meeting postpartum milestones, stable pre-eclampsia with severe features. Plan for discontinuation of magnesium at 24 hr pp lauro. Discussed labile bps with Dr. Vieyra - plan for 30 mg nifedipine daily, starting now. Anticipate d/c tomorrow on PPD#2.    BENNIE Roberts - MACARIO

## 2024-05-03 NOTE — L&D DELIVERY SUMMARY NOTE
[address] [address]                         LABOR AND DELIVERY NOTE      PATIENT NAME: WILLIAM TRONCOSO                  : 1990  MED REC NO: [id number]                      ROOM: [id number]  ACCOUNT NO: [id number]                       ADMIT DATE: 2024  PROVIDER: Karen Christianson CNM      DATE OF PROCEDURE:      The patient is a 34-year-old,  1, para 0, at 37 weeks and 2 days gestation who was admitted on 2024 for an induction of labor at 37 weeks secondary to gestational hypertension.  Her GBS status was negative.  Her pregnancy course was notable for maternal weight gain of 44 pounds, high maternal BMI.  She is Rh negative.  She did have an abnormal GCT with normal 3 hour GTT.  She developed some severe range blood pressures through the night and did receive labetalol and then subsequently Mag sulfate bolus and Mag sulfate through labor.  Her blood pressures were normal to mild range after that.  On admission, she was 1 cm dilated, 50% effaced, -2 station with a cephalic presentation.  Fetal heart tones were category 1 throughout labor and delivery.  The patient received several doses of Cytotec.  Her membranes ruptured spontaneously this afternoon for clear fluid.  Pitocin augmentation was started at that point with a max Pitocin of 4 milliunits.  She made good labor progress.  She requested and received an epidural with good relief.  She became complete and started to push and pushed for approximately 15 minutes to  and had a spontaneous vaginal delivery of a live born infant male.  Apgar scores were 7 and 9.  The infant established good color, tone, and cry with drying.  The cord was doubly clamped and cut after 2 minute delay and the infant was then placed skin to skin with the mother, and cord blood was collected.  The placenta delivered spontaneously, was inspected and found to be intact.  The uterus

## 2024-05-04 VITALS
TEMPERATURE: 98.2 F | WEIGHT: 244 LBS | HEIGHT: 65 IN | HEART RATE: 95 BPM | DIASTOLIC BLOOD PRESSURE: 76 MMHG | OXYGEN SATURATION: 98 % | BODY MASS INDEX: 40.65 KG/M2 | RESPIRATION RATE: 16 BRPM | SYSTOLIC BLOOD PRESSURE: 133 MMHG

## 2024-05-04 PROCEDURE — 6370000000 HC RX 637 (ALT 250 FOR IP): Performed by: ADVANCED PRACTICE MIDWIFE

## 2024-05-04 RX ORDER — IBUPROFEN 800 MG/1
800 TABLET ORAL EVERY 8 HOURS SCHEDULED
Qty: 60 TABLET | Refills: 0 | Status: SHIPPED | OUTPATIENT
Start: 2024-05-04

## 2024-05-04 RX ORDER — PSEUDOEPHEDRINE HCL 30 MG
100 TABLET ORAL 2 TIMES DAILY
COMMUNITY
Start: 2024-05-04

## 2024-05-04 RX ORDER — NIFEDIPINE 30 MG/1
30 TABLET, EXTENDED RELEASE ORAL DAILY
Qty: 30 TABLET | Refills: 1 | Status: SHIPPED | OUTPATIENT
Start: 2024-05-05

## 2024-05-04 RX ORDER — LANOLIN 100 %
OINTMENT (GRAM) TOPICAL
Refills: 3 | COMMUNITY
Start: 2024-05-04

## 2024-05-04 RX ADMIN — NIFEDIPINE 30 MG: 30 TABLET, FILM COATED, EXTENDED RELEASE ORAL at 09:10

## 2024-05-04 RX ADMIN — ACETAMINOPHEN 1000 MG: 500 TABLET ORAL at 01:55

## 2024-05-04 RX ADMIN — MOXIFLOXACIN HYDROCHLORIDE 800 MG: 400 TABLET, FILM COATED ORAL at 09:10

## 2024-05-04 ASSESSMENT — PAIN SCALES - GENERAL: PAINLEVEL_OUTOF10: 0

## 2024-05-04 ASSESSMENT — PAIN - FUNCTIONAL ASSESSMENT: PAIN_FUNCTIONAL_ASSESSMENT: ACTIVITIES ARE NOT PREVENTED

## 2024-05-04 NOTE — PROGRESS NOTES
Department of Obstetrics and Gynecology  Labor and Delivery  Attending Post Partum Progress Note      SUBJECTIVE:  Feels well, denies headache, visual changes, epigastric pain. MGSO4 d/c'd at 24 hours pp. Procardia started yesterday pm. Voiding qs, tolerating regular diet, ambulating well. Minimal perineal discomfort/cramping is well controlled w/ motrin. Bottle feeding. Ready for discharge today.     OBJECTIVE:      Vitals:  /67   Pulse 86   Temp 98.6 °F (37 °C) (Oral)   Resp 16   Ht 1.651 m (5' 5\")   Wt 110.7 kg (244 lb)   SpO2 98%   Breastfeeding Unknown   BMI 40.60 kg/m²     ABDOMEN:  soft and non-tender, FF@U  GENITAL/URINARY:  SLR  LE: No evidence of DVT, +2 non pitting edema of bilateral LE    DATA:    CBC:    Lab Results   Component Value Date/Time    WBC 16.5 05/03/2024 05:54 AM    RBC 3.58 05/03/2024 05:54 AM    HGB 10.6 05/03/2024 05:54 AM    HCT 31.6 05/03/2024 05:54 AM    MCV 88.0 05/03/2024 05:54 AM    RDW 14.0 05/03/2024 05:54 AM     05/03/2024 05:54 AM       ASSESSMENT & PLAN:      Primip s/p vaginal delivery  Pre eclampsia w/o proteinuria  Stable bottle feeding infant male  Discharge to home today  Reviewed discharge instructions, warning s/s to call for, bleeding precautions and pp depression s/s, breast care for engorgement  Rx procardia xl, motrin, continue OTC PNV  F/U in 5-7 days for b/p check in office.

## 2024-05-04 NOTE — PLAN OF CARE
Problem: Safety - Adult  Goal: Free from fall injury  Outcome: Progressing     Problem: Chronic Conditions and Co-morbidities  Goal: Patient's chronic conditions and co-morbidity symptoms are monitored and maintained or improved  Outcome: Progressing     Problem: Infection - Adult  Goal: Absence of infection at discharge  Outcome: Progressing  Goal: Absence of infection during hospitalization  Outcome: Progressing  Goal: Absence of fever/infection during anticipated neutropenic period  Outcome: Progressing

## 2024-05-04 NOTE — PROGRESS NOTES
Report received from LYNDA Quintero RN. Bedside report given. Introduced myself to pt as her RN for the day. I put my name and phone number on the white board and showed pt how to use her room phone to get a hold of me. Pt was given her plan of care for the day. Call light within reach. Bed in lowest position and wheels are locked. Pt verbalized understanding and denies any further needs at this time.Continue to monitor.  Tisha Ramirez RN

## 2024-05-04 NOTE — DISCHARGE INSTRUCTIONS
Thank you for the opportunity to care for you and your family.    We hope that you are happy with the care we provided during your stay in the [facility]. We want to ensure that you have the help you need when you leave the hospital. If there is anything we can assist you with, please let us know.    Breastfeeding mothers may contact our lactation specialists with any problems or questions.  The [facility] lactation services phone number is [phone].  Leave a message and your call will be returned.    Please refer to the information provided in the postpartum care booklet.  The following are warning signs to remember.    Call 911 if you have:    Chest pain or pressure  Shortness of breath, even at rest  Thoughts of harming yourself or others  Seizures    Call your healthcare provider if you have:    Temperature of 100.4 degrees or higher  Stitches that are not healing        -- Swelling, bleeding, drainage, foul odor, redness or warmth in/around your           stitches, staples, or incision (scar)        -- Bad smelling blood or discharge from the vagina  Vaginal bleeding that has increased         -- Soaking through one pad in an hour        -- You are passing clots larger than the size of a lemon  Red, warm tender area(s) in your breast or calf  Headache that does not get better, even after taking medicine; or headache with vision changes    Remember to notify all healthcare providers from your date of delivery to up to one year after giving birth!    CARING FOR YOURSELF        DIET/ACTIVITY    Eat a well balanced diet focusing on foods high in fiber and protein.  Drink plenty of fluids, especially water.  To avoid constipation you may take a mild stool softener as recommended by your doctor or midwife.  Gradually increase your activity. Resume an exercise regime only after being advised by your doctor or midwife.  When sitting or lying down, keep your legs elevated to reduce swelling.  Avoid

## 2024-05-04 NOTE — PROGRESS NOTES
Pt A/O, VSS, Pt ambulates independently. All prescriptions discharge and follow up instructions given to the pt. The pt verbalizes understanding and denies questions. MOB and FOB rooming in with baby, who is still admitted on a bili blanket. Parents advised to keep on ID bands. Verbalized understanding.

## 2024-05-04 NOTE — DISCHARGE SUMMARY
Obstetrical Discharge Form    Gestational Age: 37w2d    Antepartum complications: Pre eclampsia w/o proteinuria    Date of Delivery: 24      Type of Delivery: vaginal, spontaneous    Delivered By: MORRO HERNANDEZ     Baby:      Information for the patient's :  Baltazar Gaspar [[id number]]   APGAR One: 9   Information for the patient's :  Baltazar Gaspar [[id number]]   APGAR Five: 9   Information for the patient's :  Baltazar Gaspar [[id number]]   Birth Weight: 2.828 kg (6 lb 3.8 oz)     Anesthesia: Epidural    Intrapartum complications: Pre eclampsia w/o proteinuria    Postpartum complications: Pre eclampsia w/o proteinuria    Discharge Medication:      Medication List        START taking these medications      benzocaine-menthol 20-0.5 % Aero spray  Commonly known as: DERMOPLAST  Apply topically as needed for Pain     docusate 100 MG Caps  Commonly known as: COLACE, DULCOLAX  Take 100 mg by mouth 2 times daily     ibuprofen 800 MG tablet  Commonly known as: ADVIL;MOTRIN  Take 1 tablet by mouth every 8 hours     lanolin ointment  Apply topically as needed.     NIFEdipine 30 MG extended release tablet  Commonly known as: PROCARDIA XL  Take 1 tablet by mouth daily  Start taking on: May 5, 2024     witch hazel-glycerin pad  Commonly known as: TUCKS  Place rectally as needed.            CONTINUE taking these medications      PRENATAL 1 PO            STOP taking these medications      famotidine 20 MG tablet  Commonly known as: PEPCID               Where to Get Your Medications        These medications were sent to [facility] - [address] - P [phone] - F [phone]  [address] [place] [id number]      Phone: [phone]   NIFEdipine 30 MG extended release tablet       You can get these medications from any pharmacy    Bring a paper prescription for each of these medications  ibuprofen 800 MG tablet  You don't need a prescription for

## 2024-05-04 NOTE — PLAN OF CARE
Problem: Infection - Adult  Goal: Absence of infection at discharge  Outcome: Adequate for Discharge  Goal: Absence of infection during hospitalization  Outcome: Adequate for Discharge  Goal: Absence of fever/infection during anticipated neutropenic period  Outcome: Adequate for Discharge     Problem: Safety - Adult  Goal: Free from fall injury  Outcome: Adequate for Discharge     Problem: Discharge Planning  Goal: Discharge to home or other facility with appropriate resources  Outcome: Adequate for Discharge     Problem: Chronic Conditions and Co-morbidities  Goal: Patient's chronic conditions and co-morbidity symptoms are monitored and maintained or improved  Outcome: Adequate for Discharge

## 2024-12-20 SDOH — HEALTH STABILITY: PHYSICAL HEALTH: ON AVERAGE, HOW MANY MINUTES DO YOU ENGAGE IN EXERCISE AT THIS LEVEL?: 0 MIN

## 2024-12-20 SDOH — HEALTH STABILITY: PHYSICAL HEALTH: ON AVERAGE, HOW MANY DAYS PER WEEK DO YOU ENGAGE IN MODERATE TO STRENUOUS EXERCISE (LIKE A BRISK WALK)?: 0 DAYS

## 2024-12-23 ENCOUNTER — OFFICE VISIT (OUTPATIENT)
Dept: FAMILY MEDICINE CLINIC | Age: 34
End: 2024-12-23
Payer: COMMERCIAL

## 2024-12-23 VITALS
BODY MASS INDEX: 35.06 KG/M2 | DIASTOLIC BLOOD PRESSURE: 74 MMHG | SYSTOLIC BLOOD PRESSURE: 126 MMHG | HEIGHT: 65 IN | OXYGEN SATURATION: 97 % | HEART RATE: 105 BPM | WEIGHT: 210.4 LBS

## 2024-12-23 DIAGNOSIS — E66.812 CLASS 2 OBESITY DUE TO EXCESS CALORIES WITH BODY MASS INDEX (BMI) OF 35.0 TO 35.9 IN ADULT, UNSPECIFIED WHETHER SERIOUS COMORBIDITY PRESENT: Primary | ICD-10-CM

## 2024-12-23 DIAGNOSIS — E66.09 CLASS 2 OBESITY DUE TO EXCESS CALORIES WITH BODY MASS INDEX (BMI) OF 35.0 TO 35.9 IN ADULT, UNSPECIFIED WHETHER SERIOUS COMORBIDITY PRESENT: Primary | ICD-10-CM

## 2024-12-23 DIAGNOSIS — F32.89 OTHER DEPRESSION: ICD-10-CM

## 2024-12-23 DIAGNOSIS — Z76.89 ENCOUNTER TO ESTABLISH CARE: ICD-10-CM

## 2024-12-23 DIAGNOSIS — F41.9 ANXIETY: ICD-10-CM

## 2024-12-23 DIAGNOSIS — R06.83 HABITUAL SNORING: ICD-10-CM

## 2024-12-23 PROBLEM — F32.A DEPRESSION: Status: ACTIVE | Noted: 2024-12-23

## 2024-12-23 LAB
T3FREE SERPL-MCNC: 3.1 PG/ML (ref 2.3–4.2)
THYROPEROXIDASE AB SERPL IA-ACNC: 11 IU/ML
TSH SERPL DL<=0.005 MIU/L-ACNC: 1.45 UIU/ML (ref 0.27–4.2)

## 2024-12-23 PROCEDURE — G8484 FLU IMMUNIZE NO ADMIN: HCPCS | Performed by: NURSE PRACTITIONER

## 2024-12-23 PROCEDURE — 1036F TOBACCO NON-USER: CPT | Performed by: NURSE PRACTITIONER

## 2024-12-23 PROCEDURE — G8427 DOCREV CUR MEDS BY ELIG CLIN: HCPCS | Performed by: NURSE PRACTITIONER

## 2024-12-23 PROCEDURE — 36415 COLL VENOUS BLD VENIPUNCTURE: CPT | Performed by: NURSE PRACTITIONER

## 2024-12-23 PROCEDURE — 99203 OFFICE O/P NEW LOW 30 MIN: CPT | Performed by: NURSE PRACTITIONER

## 2024-12-23 PROCEDURE — G8417 CALC BMI ABV UP PARAM F/U: HCPCS | Performed by: NURSE PRACTITIONER

## 2024-12-23 SDOH — ECONOMIC STABILITY: FOOD INSECURITY: WITHIN THE PAST 12 MONTHS, YOU WORRIED THAT YOUR FOOD WOULD RUN OUT BEFORE YOU GOT MONEY TO BUY MORE.: NEVER TRUE

## 2024-12-23 SDOH — ECONOMIC STABILITY: INCOME INSECURITY: HOW HARD IS IT FOR YOU TO PAY FOR THE VERY BASICS LIKE FOOD, HOUSING, MEDICAL CARE, AND HEATING?: NOT HARD AT ALL

## 2024-12-23 SDOH — ECONOMIC STABILITY: FOOD INSECURITY: WITHIN THE PAST 12 MONTHS, THE FOOD YOU BOUGHT JUST DIDN'T LAST AND YOU DIDN'T HAVE MONEY TO GET MORE.: NEVER TRUE

## 2024-12-23 ASSESSMENT — PATIENT HEALTH QUESTIONNAIRE - PHQ9
SUM OF ALL RESPONSES TO PHQ QUESTIONS 1-9: 6
SUM OF ALL RESPONSES TO PHQ QUESTIONS 1-9: 6
1. LITTLE INTEREST OR PLEASURE IN DOING THINGS: NEARLY EVERY DAY
SUM OF ALL RESPONSES TO PHQ QUESTIONS 1-9: 6
2. FEELING DOWN, DEPRESSED OR HOPELESS: NEARLY EVERY DAY
SUM OF ALL RESPONSES TO PHQ QUESTIONS 1-9: 6
SUM OF ALL RESPONSES TO PHQ9 QUESTIONS 1 & 2: 6

## 2024-12-23 NOTE — PROGRESS NOTES
PROGRESS NOTE  Date of Service:  2024    SUBJECTIVE:  Patient ID: Inge Gaspar is a 34 y.o. female    HPI: new patient exam  Here to establish care  Had HTN when she was pregnant  Former use of gym  and work schedule has made working out a challenge  Occasionally has throat heartbeat and feels chest heart beat  No n/v/d or sweating with this  No sob no cp at rest  Pt gets this sensation 2 times a week  Has stressful job  Works at [employer] in [employer]  No second hand smoke  No etoh  Vision changes since delivery of child 7 months  No headaches  Bm daily  No blood in stool  Sleep child does not sleep through the night wakes 2 times a night  Caffeine- one cup of coffee daily  No drug use  Sexually active no birth control  Father passed  had sleep apnea and HTN went without cpap for one months Not morbidly obese Non smoker  Had biometric screening recently       Past Medical History:   Diagnosis Date    Gestational hypertension     G1      History reviewed. No pertinent surgical history.   Social History     Tobacco Use    Smoking status: Never    Smokeless tobacco: Never   Substance Use Topics    Alcohol use: Yes     Comment: Only one drink on occasion      Family History   Problem Relation Age of Onset    High Cholesterol Father     High Blood Pressure Father     Diabetes Father         pre diabetic    Early Death Father         58    Heart Attack Father     Heart Attack Paternal Grandfather     Heart Disease Paternal Grandfather     Stroke Maternal Grandmother         Great Grandma    Other Paternal Grandmother         Brain Aneurysm    Early Death Paternal Uncle         63    Stroke Paternal Uncle      Current Outpatient Medications on File Prior to Visit   Medication Sig Dispense Refill    ibuprofen (ADVIL;MOTRIN) 800 MG tablet Take 1 tablet by mouth every 8 hours (Patient not taking: Reported on 2024) 60 tablet 0    NIFEdipine (PROCARDIA XL) 30 MG extended release tablet Take 1

## 2024-12-24 DIAGNOSIS — R00.2 PALPITATIONS: Primary | ICD-10-CM

## 2025-01-21 ASSESSMENT — SLEEP AND FATIGUE QUESTIONNAIRES
HOW LIKELY ARE YOU TO NOD OFF OR FALL ASLEEP WHILE SITTING AND READING: MODERATE CHANCE OF DOZING
HOW LIKELY ARE YOU TO NOD OFF OR FALL ASLEEP WHILE LYING DOWN TO REST IN THE AFTERNOON WHEN CIRCUMSTANCES PERMIT: SLIGHT CHANCE OF DOZING
HOW LIKELY ARE YOU TO NOD OFF OR FALL ASLEEP WHILE SITTING QUIETLY AFTER LUNCH WITHOUT ALCOHOL: WOULD NEVER DOZE
HOW LIKELY ARE YOU TO NOD OFF OR FALL ASLEEP WHILE SITTING AND TALKING TO SOMEONE: WOULD NEVER DOZE
HOW LIKELY ARE YOU TO NOD OFF OR FALL ASLEEP WHEN YOU ARE A PASSENGER IN A CAR FOR AN HOUR WITHOUT A BREAK: WOULD NEVER DOZE
HOW LIKELY ARE YOU TO NOD OFF OR FALL ASLEEP WHILE SITTING INACTIVE IN A PUBLIC PLACE: WOULD NEVER DOZE
HOW LIKELY ARE YOU TO NOD OFF OR FALL ASLEEP WHILE WATCHING TV: SLIGHT CHANCE OF DOZING
HOW LIKELY ARE YOU TO NOD OFF OR FALL ASLEEP WHILE SITTING AND TALKING TO SOMEONE: WOULD NEVER DOZE
HOW LIKELY ARE YOU TO NOD OFF OR FALL ASLEEP IN A CAR, WHILE STOPPED FOR A FEW MINUTES IN TRAFFIC: WOULD NEVER DOZE
HOW LIKELY ARE YOU TO NOD OFF OR FALL ASLEEP WHILE SITTING QUIETLY AFTER LUNCH WITHOUT ALCOHOL: WOULD NEVER DOZE
HOW LIKELY ARE YOU TO NOD OFF OR FALL ASLEEP WHILE LYING DOWN TO REST IN THE AFTERNOON WHEN CIRCUMSTANCES PERMIT: SLIGHT CHANCE OF DOZING
ESS TOTAL SCORE: 4
HOW LIKELY ARE YOU TO NOD OFF OR FALL ASLEEP IN A CAR, WHILE STOPPED FOR A FEW MINUTES IN TRAFFIC: WOULD NEVER DOZE
HOW LIKELY ARE YOU TO NOD OFF OR FALL ASLEEP WHILE SITTING AND READING: MODERATE CHANCE OF DOZING
HOW LIKELY ARE YOU TO NOD OFF OR FALL ASLEEP WHEN YOU ARE A PASSENGER IN A CAR FOR AN HOUR WITHOUT A BREAK: WOULD NEVER DOZE
HOW LIKELY ARE YOU TO NOD OFF OR FALL ASLEEP WHILE SITTING INACTIVE IN A PUBLIC PLACE: WOULD NEVER DOZE
HOW LIKELY ARE YOU TO NOD OFF OR FALL ASLEEP WHILE WATCHING TV: SLIGHT CHANCE OF DOZING

## 2025-01-24 ENCOUNTER — TELEPHONE (OUTPATIENT)
Dept: CARDIOLOGY CLINIC | Age: 35
End: 2025-01-24

## 2025-01-24 ENCOUNTER — OFFICE VISIT (OUTPATIENT)
Age: 35
End: 2025-01-24
Payer: COMMERCIAL

## 2025-01-24 ENCOUNTER — ANCILLARY PROCEDURE (OUTPATIENT)
Dept: CARDIOLOGY CLINIC | Age: 35
End: 2025-01-24
Payer: COMMERCIAL

## 2025-01-24 VITALS
WEIGHT: 209 LBS | RESPIRATION RATE: 16 BRPM | HEIGHT: 65 IN | DIASTOLIC BLOOD PRESSURE: 97 MMHG | OXYGEN SATURATION: 98 % | BODY MASS INDEX: 34.82 KG/M2 | HEART RATE: 84 BPM | SYSTOLIC BLOOD PRESSURE: 135 MMHG

## 2025-01-24 DIAGNOSIS — R00.2 PALPITATIONS: ICD-10-CM

## 2025-01-24 DIAGNOSIS — G47.30 SLEEP APNEA, UNSPECIFIED TYPE: Primary | ICD-10-CM

## 2025-01-24 PROCEDURE — 1036F TOBACCO NON-USER: CPT | Performed by: INTERNAL MEDICINE

## 2025-01-24 PROCEDURE — G8417 CALC BMI ABV UP PARAM F/U: HCPCS | Performed by: INTERNAL MEDICINE

## 2025-01-24 PROCEDURE — 99203 OFFICE O/P NEW LOW 30 MIN: CPT | Performed by: INTERNAL MEDICINE

## 2025-01-24 PROCEDURE — 93242 EXT ECG>48HR<7D RECORDING: CPT | Performed by: INTERNAL MEDICINE

## 2025-01-24 PROCEDURE — G8428 CUR MEDS NOT DOCUMENT: HCPCS | Performed by: INTERNAL MEDICINE

## 2025-01-24 NOTE — PROGRESS NOTES
SLEEP MEDICINE CONSULT NOTE  CC: Snoring  Referring Provider: Patient is being seen at the request of DAISY Longoria for a consultation to evaluate for Obstructive Sleep Apnea.     Presenting HPI 1/24/25:   History of Present Illness  The patient presents for evaluation of sleep apnea.    She is seeking a baseline assessment of her sleep health due to her father's history of sleep apnea, diagnosed over a decade ago. Her father recently passed away at the age of 58, with the cause of death currently under investigation. He was not using his CPAP machine for 30 days prior to his death due to insurance issues. The preliminary ruling suggests hypertension and cardiac complications. She has been in a relationship with her  for 11 years, during which he observed episodes of apnea and kicking movements during her sleep. However, due to his night shift work schedule, he is uncertain if these symptoms persist. She reports never feeling fully rested upon waking and relies on caffeine to compensate for poor sleep quality. She has a 9-month-old child who does not sleep through the night, often waking her up at 2:00 AM, after which she struggles to return to sleep. Her typical bedtime is between 8:30 PM and 9:00 PM, and she wakes up around 7:00 AM to 7:30 AM. She also reports nocturia, necessitating bathroom visits at night. She has expressed interest in the Inspire implant as a potential treatment option. She has not achieved her pre-pregnancy weight of 160 pounds.       ~ 8 hrs of sleep per night  Bedtime 9 pm & rise time 7 am  Restroom 1x/night     0 naps during the day    No car wrecks/near wrecks because of the sleepiness or nodding off while driving.       SOCIAL HISTORY  She is originally from [place]. She has been  for 11 years. She has a 9-month-old child. She works at [employer] inside the [employer].    FAMILY HISTORY  Her father was diagnosed with sleep apnea at least 10 years ago and recently

## 2025-01-24 NOTE — PROGRESS NOTES
MA Communication:  The following orders are received by verbal communication from Shad Nunes MD        Orders include:        HST follow up with PA

## 2025-01-24 NOTE — TELEPHONE ENCOUNTER
Monitor placed by TYSON Cortes RN  Monitor company [employer]  Length of monitor 3 days  Monitor ordered by Laurie Longoria NP  Patch ID [id number]  Activation successful prior to pt leaving office? Yes

## 2025-01-24 NOTE — PATIENT INSTRUCTIONS
What's next:  The Sleep Center at [facility] - [address], [address], [place] [address]  The Sleep center at [facility] - [facility] [address] [place] [facility], [place] [address]  Phone for both is: [phone] Fax: [phone]    The sleep lab will call you within the next 1 week to schedule a study (if the sleep lab does not reach you within 1 week, please call them at [phone])    Meet with our sleep NP or PA after your sleep study to discuss results, options and recommendations.  If you do not have an appointment already scheduled and have not heard from my office within one week of your sleep study, please call my office to schedule an appointment.          Please refrain from or reduce the use of caffeine and/or alcohol prior to your sleep study and avoid napping the day of your study, as these will affect the accuracy of your test results.  If you are ill the day of your test (COVID-19, cold, upper respiratory infection, flu, etc.) please call to reschedule your test as the test will not be accurate, and for other patients' safety. Avoid napping the day prior to your sleep study as this may make it harder to fall asleep.     If you have any questions or need to cancel/reschedule your appointment, please call the sleep lab @ [phone]     For in-lab testing: please bring with you:  Appropriate nightclothes (pajamas, sweats, etc.), slippers and robe  All medications you will need during you stay, including any breathing medications, nebulizers and metered dose inhalers  Your own toiletries and hairdryer if you wish to shower before you leave  Current photo I.D. and Insurance card  We recommend that you not bring valuables with you to the Sleep Center, as we cannot be responsible for any lost or damaged personal items.  Please note:  There is no smoking allowed anywhere on [facility] at any time.  Each patient room is a private room with a private bathroom.  We do not allow any pillows or

## 2025-02-10 PROCEDURE — 93244 EXT ECG>48HR<7D REV&INTERPJ: CPT | Performed by: INTERNAL MEDICINE

## 2025-02-11 ENCOUNTER — HOSPITAL ENCOUNTER (OUTPATIENT)
Dept: SLEEP CENTER | Age: 35
Discharge: HOME | End: 2025-02-13
Payer: COMMERCIAL

## 2025-02-11 DIAGNOSIS — G47.30 SLEEP APNEA, UNSPECIFIED TYPE: ICD-10-CM

## 2025-02-11 PROCEDURE — 95806 SLEEP STUDY UNATT&RESP EFFT: CPT

## 2025-02-15 PROBLEM — G47.30 SLEEP APNEA: Status: ACTIVE | Noted: 2025-02-15

## 2025-02-18 ASSESSMENT — SLEEP AND FATIGUE QUESTIONNAIRES
HOW LIKELY ARE YOU TO NOD OFF OR FALL ASLEEP WHILE SITTING INACTIVE IN A PUBLIC PLACE: WOULD NEVER DOZE
HOW LIKELY ARE YOU TO NOD OFF OR FALL ASLEEP WHILE SITTING AND TALKING TO SOMEONE: WOULD NEVER DOZE
HOW LIKELY ARE YOU TO NOD OFF OR FALL ASLEEP WHILE SITTING QUIETLY AFTER LUNCH WITHOUT ALCOHOL: WOULD NEVER DOZE
HOW LIKELY ARE YOU TO NOD OFF OR FALL ASLEEP WHEN YOU ARE A PASSENGER IN A CAR FOR AN HOUR WITHOUT A BREAK: WOULD NEVER DOZE
HOW LIKELY ARE YOU TO NOD OFF OR FALL ASLEEP WHEN YOU ARE A PASSENGER IN A CAR FOR AN HOUR WITHOUT A BREAK: WOULD NEVER DOZE
ESS TOTAL SCORE: 3
HOW LIKELY ARE YOU TO NOD OFF OR FALL ASLEEP WHILE SITTING INACTIVE IN A PUBLIC PLACE: WOULD NEVER DOZE
HOW LIKELY ARE YOU TO NOD OFF OR FALL ASLEEP WHILE SITTING AND READING: SLIGHT CHANCE OF DOZING
HOW LIKELY ARE YOU TO NOD OFF OR FALL ASLEEP IN A CAR, WHILE STOPPED FOR A FEW MINUTES IN TRAFFIC: WOULD NEVER DOZE
HOW LIKELY ARE YOU TO NOD OFF OR FALL ASLEEP WHILE LYING DOWN TO REST IN THE AFTERNOON WHEN CIRCUMSTANCES PERMIT: SLIGHT CHANCE OF DOZING
HOW LIKELY ARE YOU TO NOD OFF OR FALL ASLEEP WHILE WATCHING TV: SLIGHT CHANCE OF DOZING
HOW LIKELY ARE YOU TO NOD OFF OR FALL ASLEEP WHILE SITTING QUIETLY AFTER LUNCH WITHOUT ALCOHOL: WOULD NEVER DOZE
HOW LIKELY ARE YOU TO NOD OFF OR FALL ASLEEP WHILE WATCHING TV: SLIGHT CHANCE OF DOZING
HOW LIKELY ARE YOU TO NOD OFF OR FALL ASLEEP WHILE SITTING AND READING: SLIGHT CHANCE OF DOZING
HOW LIKELY ARE YOU TO NOD OFF OR FALL ASLEEP WHILE SITTING AND TALKING TO SOMEONE: WOULD NEVER DOZE
HOW LIKELY ARE YOU TO NOD OFF OR FALL ASLEEP IN A CAR, WHILE STOPPED FOR A FEW MINUTES IN TRAFFIC: WOULD NEVER DOZE
HOW LIKELY ARE YOU TO NOD OFF OR FALL ASLEEP WHILE LYING DOWN TO REST IN THE AFTERNOON WHEN CIRCUMSTANCES PERMIT: SLIGHT CHANCE OF DOZING

## 2025-02-21 ENCOUNTER — OFFICE VISIT (OUTPATIENT)
Age: 35
End: 2025-02-21
Payer: COMMERCIAL

## 2025-02-21 VITALS
DIASTOLIC BLOOD PRESSURE: 74 MMHG | RESPIRATION RATE: 20 BRPM | OXYGEN SATURATION: 99 % | HEIGHT: 65 IN | BODY MASS INDEX: 34.32 KG/M2 | HEART RATE: 88 BPM | WEIGHT: 206 LBS | SYSTOLIC BLOOD PRESSURE: 122 MMHG

## 2025-02-21 DIAGNOSIS — G47.33 SEVERE OBSTRUCTIVE SLEEP APNEA: Primary | ICD-10-CM

## 2025-02-21 DIAGNOSIS — B00.89 RECURRENT ORAL HERPES SIMPLEX INFECTION: ICD-10-CM

## 2025-02-21 PROBLEM — R06.83 HABITUAL SNORING: Status: RESOLVED | Noted: 2024-12-23 | Resolved: 2025-02-21

## 2025-02-21 PROCEDURE — G8417 CALC BMI ABV UP PARAM F/U: HCPCS

## 2025-02-21 PROCEDURE — 99214 OFFICE O/P EST MOD 30 MIN: CPT

## 2025-02-21 PROCEDURE — 1036F TOBACCO NON-USER: CPT

## 2025-02-21 PROCEDURE — G8427 DOCREV CUR MEDS BY ELIG CLIN: HCPCS

## 2025-02-21 RX ORDER — VALACYCLOVIR HYDROCHLORIDE 1 G/1
2000 TABLET, FILM COATED ORAL 2 TIMES DAILY
Qty: 20 TABLET | Refills: 0 | Status: SHIPPED | OUTPATIENT
Start: 2025-02-21 | End: 2025-02-22

## 2025-02-21 NOTE — PROGRESS NOTES
SLEEP MEDICINE PROGRESS NOTE  CC: Snoring  Referring Provider: Laurie Longoria CNP    Interval History 2/21/25:  f/u HST  -  Had HST to 1125 demonstrating severe MARY ANN with AHI 38.  ESS is: 3.  Pt is not surprised by result and is was expecting to discuss CPAP today.  She is receptive to trying is and her only concern is that she has a  for many years and had trouble tolerating positive pressure in other scenarios, but that was using a full face mask.  She would not be interested in using an oral appliance as she did not tolerate this well for teeth grinding in the past.      Presenting HPI 1/24/25 Nunes:   She is seeking a baseline assessment of her sleep health due to her father's history of sleep apnea, diagnosed over a decade ago. Her father recently passed away at the age of 58, with the cause of death currently under investigation. He was not using his CPAP machine for 30 days prior to his death due to insurance issues. The preliminary ruling suggests hypertension and cardiac complications. She has been in a relationship with her  for 11 years, during which he observed episodes of apnea and kicking movements during her sleep. However, due to his night shift work schedule, he is uncertain if these symptoms persist. She reports never feeling fully rested upon waking and relies on caffeine to compensate for poor sleep quality. She has a 9-month-old child who does not sleep through the night, often waking her up at 2:00 AM, after which she struggles to return to sleep. Her typical bedtime is between 8:30 PM and 9:00 PM, and she wakes up around 7:00 AM to 7:30 AM. She also reports nocturia, necessitating bathroom visits at night. She has expressed interest in the Inspire implant as a potential treatment option. She has not achieved her pre-pregnancy weight of 160 pounds.  ~ 8 hrs of sleep per night  Bedtime 9 pm & rise time 7 am  Restroom 1x/night     0 naps during the day    No car wrecks/near

## 2025-02-21 NOTE — PATIENT INSTRUCTIONS
What's next:  Trying auto-CPAP:  Let our office know to which medical supply company (\"DME company\") we should send the CPAP prescription.  The best way to choose a company is by calling your insurance and asking which DME companies are in network for you, or searching on your insurance's website, and choosing from those options.  That DME company will run an authorization for CPAP through your insurance and then get you set up with your machine and equipment.    #1 mask today:  ResMed AirFit N30i (nasal cushion, tubing out top)  #2: [employer] Brevida nasal pillows   #3: [employer] Micro Nova   After getting home with your machine, try to de-sensitize yourself to the CPAP & mask - people often do better if they try it out during the day and practice breathing with it while focusing on another task, such as reading, playing a game or watching TV.  You can ask your DME for a different mask if what you first tried isn't comfortable.  Often times, DME companies will allow you to trade out a mask if you ask within the first two weeks.   We prefer nasal mask or nasal pillows instead of full face masks for the treatment of MARY ANN.  People who cannot use a nasal interface should change to a full face mask.    Call or message our office if the air pressures are not tolerable.  It is possible we can make adjustments to the settings while keeping your treatment benefit in mind.    After getting set up with CPAP, you must be seen within 31-90 days.  At this appointment, insurance typically needs to see that you are using the device at least 4 hours each night for at least 70% of nights in a month.  Please bring your machine and power cord to this appointment.   Continue to work on sleep hygiene tips listed below.      It was great to see you again and take care Inge!  -Zoë  ______________________________________________________________________  Never drive a car or operate a motorized vehicle while drowsy or

## 2025-06-11 ENCOUNTER — TELEPHONE (OUTPATIENT)
Dept: PULMONOLOGY | Age: 35
End: 2025-06-11

## 2025-06-11 NOTE — TELEPHONE ENCOUNTER
I have been tracking CPAP setup for patient.  Orders were originally faxed on 4/2/25.  Since that time patient has yet to get setup (stating she has been busy).  As of last week, pt told [employer] that she has been busy and will call them when she gets a chance.    OK to close message and wait for patient to get setup?  See my notes in the Pulmonology Comments Sticky Note where I have been tracking this (if needed).

## 2025-07-11 NOTE — TELEPHONE ENCOUNTER
Can we please send patient a letter for severe MARY ANN recommending she schedule follow up to discuss treatment

## 2025-07-11 NOTE — TELEPHONE ENCOUNTER
Saurabh Sullivan,   Update on Inge: she had a home sleep study demonstrating severe MARY ANN with AHI of 38.  I recommended starting autoCPAP and she was agreeable to this.  However, our office and the medical supply company that she chose has been unable to facilitate starting her on treatment.  I am, of course, happy to see her back at any time; when she does proceed with CPAP set up we will arrange follow up.   Thank you for the referral,   Zoë Chi

## 2025-08-01 ENCOUNTER — HOSPITAL ENCOUNTER (EMERGENCY)
Age: 35
Discharge: HOME | End: 2025-08-01
Attending: EMERGENCY MEDICINE
Payer: COMMERCIAL

## 2025-08-01 VITALS
HEIGHT: 65 IN | RESPIRATION RATE: 18 BRPM | DIASTOLIC BLOOD PRESSURE: 82 MMHG | OXYGEN SATURATION: 99 % | TEMPERATURE: 98.8 F | BODY MASS INDEX: 32.99 KG/M2 | WEIGHT: 198 LBS | SYSTOLIC BLOOD PRESSURE: 117 MMHG | HEART RATE: 80 BPM

## 2025-08-01 DIAGNOSIS — R00.2 PALPITATIONS: Primary | ICD-10-CM

## 2025-08-01 LAB
ANION GAP SERPL CALCULATED.3IONS-SCNC: 11 MMOL/L (ref 3–16)
BASOPHILS # BLD: 0 K/UL (ref 0–0.2)
BASOPHILS NFR BLD: 0.2 %
BILIRUB UR QL STRIP.AUTO: NEGATIVE
BUN SERPL-MCNC: 10 MG/DL (ref 7–20)
CALCIUM SERPL-MCNC: 9.7 MG/DL (ref 8.3–10.6)
CHLORIDE SERPL-SCNC: 105 MMOL/L (ref 99–110)
CLARITY UR: CLEAR
CO2 SERPL-SCNC: 24 MMOL/L (ref 21–32)
COLOR UR: YELLOW
CREAT SERPL-MCNC: 0.9 MG/DL (ref 0.6–1.1)
DEPRECATED RDW RBC AUTO: 13.5 % (ref 12.4–15.4)
EKG ATRIAL RATE: 75 BPM
EKG DIAGNOSIS: NORMAL
EKG P AXIS: 44 DEGREES
EKG P-R INTERVAL: 148 MS
EKG Q-T INTERVAL: 366 MS
EKG QRS DURATION: 80 MS
EKG QTC CALCULATION (BAZETT): 408 MS
EKG R AXIS: 16 DEGREES
EKG T AXIS: 6 DEGREES
EKG VENTRICULAR RATE: 75 BPM
EOSINOPHIL # BLD: 0.1 K/UL (ref 0–0.6)
EOSINOPHIL NFR BLD: 0.9 %
GFR SERPLBLD CREATININE-BSD FMLA CKD-EPI: 86 ML/MIN/{1.73_M2}
GLUCOSE SERPL-MCNC: 98 MG/DL (ref 70–99)
GLUCOSE UR STRIP.AUTO-MCNC: NEGATIVE MG/DL
HCG UR QL: NEGATIVE
HCT VFR BLD AUTO: 41.3 % (ref 36–48)
HGB BLD-MCNC: 14.2 G/DL (ref 12–16)
HGB UR QL STRIP.AUTO: NEGATIVE
KETONES UR STRIP.AUTO-MCNC: NEGATIVE MG/DL
LEUKOCYTE ESTERASE UR QL STRIP.AUTO: NEGATIVE
LYMPHOCYTES # BLD: 1.4 K/UL (ref 1–5.1)
LYMPHOCYTES NFR BLD: 17.1 %
MCH RBC QN AUTO: 30.2 PG (ref 26–34)
MCHC RBC AUTO-ENTMCNC: 34.3 G/DL (ref 31–36)
MCV RBC AUTO: 87.9 FL (ref 80–100)
MONOCYTES # BLD: 0.4 K/UL (ref 0–1.3)
MONOCYTES NFR BLD: 5.5 %
NEUTROPHILS # BLD: 6.1 K/UL (ref 1.7–7.7)
NEUTROPHILS NFR BLD: 76.3 %
NITRITE UR QL STRIP.AUTO: NEGATIVE
PH UR STRIP.AUTO: 6.5 [PH] (ref 5–8)
PLATELET # BLD AUTO: 235 K/UL (ref 135–450)
PMV BLD AUTO: 8.8 FL (ref 5–10.5)
POTASSIUM SERPL-SCNC: 4.2 MMOL/L (ref 3.5–5.1)
PROT UR STRIP.AUTO-MCNC: NEGATIVE MG/DL
RBC # BLD AUTO: 4.7 M/UL (ref 4–5.2)
SODIUM SERPL-SCNC: 140 MMOL/L (ref 136–145)
SP GR UR STRIP.AUTO: <=1.005 (ref 1–1.03)
TROPONIN, HIGH SENSITIVITY: <6 NG/L (ref 0–14)
TROPONIN, HIGH SENSITIVITY: <6 NG/L (ref 0–14)
UA COMPLETE W REFLEX CULTURE PNL UR: NORMAL
UA DIPSTICK W REFLEX MICRO PNL UR: NORMAL
URN SPEC COLLECT METH UR: NORMAL
UROBILINOGEN UR STRIP-ACNC: 0.2 E.U./DL
WBC # BLD AUTO: 8 K/UL (ref 4–11)

## 2025-08-01 PROCEDURE — 85025 COMPLETE CBC W/AUTO DIFF WBC: CPT

## 2025-08-01 PROCEDURE — 93010 ELECTROCARDIOGRAM REPORT: CPT | Performed by: INTERNAL MEDICINE

## 2025-08-01 PROCEDURE — 36415 COLL VENOUS BLD VENIPUNCTURE: CPT

## 2025-08-01 PROCEDURE — 99284 EMERGENCY DEPT VISIT MOD MDM: CPT

## 2025-08-01 PROCEDURE — 80048 BASIC METABOLIC PNL TOTAL CA: CPT

## 2025-08-01 PROCEDURE — 84703 CHORIONIC GONADOTROPIN ASSAY: CPT

## 2025-08-01 PROCEDURE — 81003 URINALYSIS AUTO W/O SCOPE: CPT

## 2025-08-01 PROCEDURE — 84484 ASSAY OF TROPONIN QUANT: CPT

## 2025-08-01 PROCEDURE — 93005 ELECTROCARDIOGRAM TRACING: CPT | Performed by: EMERGENCY MEDICINE

## 2025-08-01 ASSESSMENT — PAIN - FUNCTIONAL ASSESSMENT: PAIN_FUNCTIONAL_ASSESSMENT: 0-10

## 2025-08-01 ASSESSMENT — LIFESTYLE VARIABLES
HOW OFTEN DO YOU HAVE A DRINK CONTAINING ALCOHOL: MONTHLY OR LESS
HOW MANY STANDARD DRINKS CONTAINING ALCOHOL DO YOU HAVE ON A TYPICAL DAY: 1 OR 2

## 2025-08-01 ASSESSMENT — PAIN SCALES - GENERAL: PAINLEVEL_OUTOF10: 2

## 2025-08-01 NOTE — ED PROVIDER NOTES
[facility] EMERGENCY DEPARTMENT     EMERGENCY DEPARTMENT ENCOUNTER            Pt Name: Inge Gaspar   MRN: [id number]   Birthdate 1990   Date of evaluation: 8/1/2025   Provider: Jose Baez MD   PCP: Laurie Longoria APRN - CNP   Note Started: 6:48 PM EDT 8/1/25          CHIEF COMPLAINT     Chief Complaint   Patient presents with    Palpitations     Checked watch and heart rate was 140, dizziness and nauseated, has been on cpap for past month for sleep apnea             HISTORY OF PRESENT ILLNESS:   History from : Patient   Limitations to history : None     Inge Gaspar is a 35 y.o. female who presents with a history of sleep apnea, presents to the Emergency Department with complaints of nausea, dizziness, and a brief episode of tachycardia. The patient reports that while driving east with her partner to meet with builders, she experienced sudden onset of nausea and dizziness, accompanied by a rapid increase in heart rate to 140 beats per minute while sitting in the car.    The episode of tachycardia lasted for approximately one minute. The patient was taken to a fire station where her heart rate was noted to have decreased to 89 beats per minute. She initially refused further care at that time and had something to eat, as she had not eaten since 8 PM the previous night. However, due to persistent facial flushing and dizziness, she was brought to the Emergency Department via ambulance.    The patient denies any fever, urinary symptoms, vomiting, diarrhea, blood in urine, blood in stool, or rashes. She reports significant recent stress related to her father's death from a heart attack in November and the potential sale of his house today. The patient believes her symptoms may be related to anxiety given these circumstances.    Of note, the patient delivered a child 15 months ago. She denies use of birth control. Her thyroid function was checked in December and reported as normal. She      DISPOSITION/PLAN     PATIENT REFERRED TO:   Laurie Longoria, APRN - CNP  [address] [address]  [phone]    Schedule an appointment as soon as possible for a visit in 1 week         DISCHARGE MEDICATIONS:   Discharge Medication List as of 8/1/2025  6:03 PM           DISCONTINUED MEDICATIONS:   Discharge Medication List as of 8/1/2025  6:03 PM                 (Please note that portions of this note were completed with a voice recognition program.  Efforts were made to edit the dictations but occasionally words are mis-transcribed.)       Jose Baez MD (electronically signed)              Jose Baez MD  08/01/25 [id number]       Jose Baez MD  08/01/25 [id number]

## 2025-08-04 ENCOUNTER — TELEMEDICINE (OUTPATIENT)
Age: 35
End: 2025-08-04
Payer: COMMERCIAL

## 2025-08-04 ENCOUNTER — TELEPHONE (OUTPATIENT)
Dept: PULMONOLOGY | Age: 35
End: 2025-08-04

## 2025-08-04 DIAGNOSIS — G47.33 SEVERE OBSTRUCTIVE SLEEP APNEA: Primary | ICD-10-CM

## 2025-08-04 DIAGNOSIS — R40.0 DAYTIME SLEEPINESS: ICD-10-CM

## 2025-08-04 PROCEDURE — 99213 OFFICE O/P EST LOW 20 MIN: CPT

## 2025-08-04 PROCEDURE — 1036F TOBACCO NON-USER: CPT

## 2025-08-04 PROCEDURE — G8427 DOCREV CUR MEDS BY ELIG CLIN: HCPCS

## 2025-08-04 PROCEDURE — G8417 CALC BMI ABV UP PARAM F/U: HCPCS

## 2025-08-04 ASSESSMENT — SLEEP AND FATIGUE QUESTIONNAIRES
HOW LIKELY ARE YOU TO NOD OFF OR FALL ASLEEP WHILE SITTING AND TALKING TO SOMEONE: WOULD NEVER DOZE
HOW LIKELY ARE YOU TO NOD OFF OR FALL ASLEEP WHEN YOU ARE A PASSENGER IN A CAR FOR AN HOUR WITHOUT A BREAK: WOULD NEVER DOZE
HOW LIKELY ARE YOU TO NOD OFF OR FALL ASLEEP WHILE SITTING QUIETLY AFTER LUNCH WITHOUT ALCOHOL: WOULD NEVER DOZE
HOW LIKELY ARE YOU TO NOD OFF OR FALL ASLEEP WHILE SITTING INACTIVE IN A PUBLIC PLACE: WOULD NEVER DOZE
HOW LIKELY ARE YOU TO NOD OFF OR FALL ASLEEP IN A CAR, WHILE STOPPED FOR A FEW MINUTES IN TRAFFIC: WOULD NEVER DOZE
HOW LIKELY ARE YOU TO NOD OFF OR FALL ASLEEP WHILE WATCHING TV: SLIGHT CHANCE OF DOZING
ESS TOTAL SCORE: 4
HOW LIKELY ARE YOU TO NOD OFF OR FALL ASLEEP WHILE SITTING AND READING: HIGH CHANCE OF DOZING
HOW LIKELY ARE YOU TO NOD OFF OR FALL ASLEEP WHILE LYING DOWN TO REST IN THE AFTERNOON WHEN CIRCUMSTANCES PERMIT: WOULD NEVER DOZE